# Patient Record
Sex: FEMALE
[De-identification: names, ages, dates, MRNs, and addresses within clinical notes are randomized per-mention and may not be internally consistent; named-entity substitution may affect disease eponyms.]

---

## 2017-01-19 ENCOUNTER — APPOINTMENT (OUTPATIENT)
Dept: INTERNAL MEDICINE | Facility: CLINIC | Age: 42
End: 2017-01-19

## 2017-02-07 ENCOUNTER — APPOINTMENT (OUTPATIENT)
Dept: INTERNAL MEDICINE | Facility: CLINIC | Age: 42
End: 2017-02-07

## 2017-02-07 VITALS
HEART RATE: 102 BPM | OXYGEN SATURATION: 98 % | DIASTOLIC BLOOD PRESSURE: 62 MMHG | HEIGHT: 61.5 IN | WEIGHT: 130 LBS | SYSTOLIC BLOOD PRESSURE: 108 MMHG | TEMPERATURE: 99.1 F | BODY MASS INDEX: 24.23 KG/M2

## 2017-02-07 DIAGNOSIS — T81.31XA DISRUPTION OF EXTERNAL OPERATION (SURGICAL) WOUND, NOT ELSEWHERE CLASSIFIED, INITIAL ENCOUNTER: ICD-10-CM

## 2017-02-08 LAB
25(OH)D3 SERPL-MCNC: 31.2 NG/ML
ALBUMIN SERPL ELPH-MCNC: 4.2 G/DL
ALP BLD-CCNC: 69 U/L
ALT SERPL-CCNC: 19 U/L
ANION GAP SERPL CALC-SCNC: 12 MMOL/L
AST SERPL-CCNC: 23 U/L
BASOPHILS # BLD AUTO: 0.04 K/UL
BASOPHILS NFR BLD AUTO: 0.5 %
BILIRUB SERPL-MCNC: 0.6 MG/DL
BUN SERPL-MCNC: 10 MG/DL
CALCIUM SERPL-MCNC: 9.7 MG/DL
CHLORIDE SERPL-SCNC: 102 MMOL/L
CHOLEST SERPL-MCNC: 181 MG/DL
CHOLEST/HDLC SERPL: 2.7 RATIO
CO2 SERPL-SCNC: 25 MMOL/L
CREAT SERPL-MCNC: 0.81 MG/DL
EOSINOPHIL # BLD AUTO: 0.17 K/UL
EOSINOPHIL NFR BLD AUTO: 2.1 %
GLUCOSE SERPL-MCNC: 97 MG/DL
HCT VFR BLD CALC: 43.7 %
HDLC SERPL-MCNC: 67 MG/DL
HGB BLD-MCNC: 13.4 G/DL
HIV1+2 AB SPEC QL IA.RAPID: NONREACTIVE
HSV 1+2 IGG SER IA-IMP: POSITIVE
HSV 1+2 IGG SER IA-IMP: POSITIVE
HSV1 IGG SER QL: 49.9 INDEX
HSV2 IGG SER QL: 8.45 INDEX
IMM GRANULOCYTES NFR BLD AUTO: 0.1 %
LDLC SERPL CALC-MCNC: 100 MG/DL
LYMPHOCYTES # BLD AUTO: 2.03 K/UL
LYMPHOCYTES NFR BLD AUTO: 25.1 %
MAN DIFF?: NORMAL
MCHC RBC-ENTMCNC: 27.7 PG
MCHC RBC-ENTMCNC: 30.7 GM/DL
MCV RBC AUTO: 90.5 FL
MONOCYTES # BLD AUTO: 0.36 K/UL
MONOCYTES NFR BLD AUTO: 4.4 %
NEUTROPHILS # BLD AUTO: 5.49 K/UL
NEUTROPHILS NFR BLD AUTO: 67.8 %
PLATELET # BLD AUTO: 294 K/UL
POTASSIUM SERPL-SCNC: 4.3 MMOL/L
PROT SERPL-MCNC: 7.1 G/DL
RBC # BLD: 4.83 M/UL
RBC # FLD: 14.4 %
SODIUM SERPL-SCNC: 139 MMOL/L
T PALLIDUM AB SER QL IA: NEGATIVE
TRIGL SERPL-MCNC: 72 MG/DL
TSH SERPL-ACNC: 0.67 UIU/ML
WBC # FLD AUTO: 8.1 K/UL

## 2017-04-03 ENCOUNTER — APPOINTMENT (OUTPATIENT)
Dept: DERMATOLOGY | Facility: CLINIC | Age: 42
End: 2017-04-03

## 2017-04-24 ENCOUNTER — APPOINTMENT (OUTPATIENT)
Dept: DERMATOLOGY | Facility: CLINIC | Age: 42
End: 2017-04-24

## 2017-04-24 VITALS
WEIGHT: 130 LBS | DIASTOLIC BLOOD PRESSURE: 80 MMHG | BODY MASS INDEX: 24.55 KG/M2 | HEIGHT: 61 IN | SYSTOLIC BLOOD PRESSURE: 120 MMHG

## 2017-06-02 ENCOUNTER — APPOINTMENT (OUTPATIENT)
Dept: PLASTIC SURGERY | Facility: CLINIC | Age: 42
End: 2017-06-02

## 2018-01-08 ENCOUNTER — APPOINTMENT (OUTPATIENT)
Dept: INTERNAL MEDICINE | Facility: CLINIC | Age: 43
End: 2018-01-08
Payer: MEDICAID

## 2018-01-08 ENCOUNTER — OTHER (OUTPATIENT)
Age: 43
End: 2018-01-08

## 2018-01-08 VITALS
SYSTOLIC BLOOD PRESSURE: 120 MMHG | DIASTOLIC BLOOD PRESSURE: 60 MMHG | TEMPERATURE: 98 F | BODY MASS INDEX: 22.44 KG/M2 | OXYGEN SATURATION: 99 % | HEART RATE: 61 BPM | HEIGHT: 61.5 IN | WEIGHT: 120.38 LBS

## 2018-01-08 DIAGNOSIS — Z84.0 FAMILY HISTORY OF DISEASES OF THE SKIN AND SUBCUTANEOUS TISSUE: ICD-10-CM

## 2018-01-08 DIAGNOSIS — L90.5 SCAR CONDITIONS AND FIBROSIS OF SKIN: ICD-10-CM

## 2018-01-08 DIAGNOSIS — E85.4 ORGAN-LIMITED AMYLOIDOSIS: ICD-10-CM

## 2018-01-08 DIAGNOSIS — L99 ORGAN-LIMITED AMYLOIDOSIS: ICD-10-CM

## 2018-01-08 DIAGNOSIS — H61.21 IMPACTED CERUMEN, RIGHT EAR: ICD-10-CM

## 2018-01-08 DIAGNOSIS — R21 RASH AND OTHER NONSPECIFIC SKIN ERUPTION: ICD-10-CM

## 2018-01-08 DIAGNOSIS — E53.8 DEFICIENCY OF OTHER SPECIFIED B GROUP VITAMINS: ICD-10-CM

## 2018-01-08 DIAGNOSIS — Z87.891 PERSONAL HISTORY OF NICOTINE DEPENDENCE: ICD-10-CM

## 2018-01-08 DIAGNOSIS — L74.519 PRIMARY FOCAL HYPERHIDROSIS, UNSPECIFIED: ICD-10-CM

## 2018-01-08 PROCEDURE — 99396 PREV VISIT EST AGE 40-64: CPT | Mod: 25

## 2018-01-08 PROCEDURE — 36415 COLL VENOUS BLD VENIPUNCTURE: CPT

## 2018-01-08 PROCEDURE — 99212 OFFICE O/P EST SF 10 MIN: CPT | Mod: 25

## 2018-01-08 PROCEDURE — 93000 ELECTROCARDIOGRAM COMPLETE: CPT

## 2018-01-08 PROCEDURE — 90686 IIV4 VACC NO PRSV 0.5 ML IM: CPT

## 2018-01-08 PROCEDURE — G0008: CPT

## 2018-01-08 RX ORDER — ALUMINUM CHLORIDE 20 %
20 SOLUTION, NON-ORAL TOPICAL
Qty: 1 | Refills: 6 | Status: COMPLETED | COMMUNITY
Start: 2017-05-04 | End: 2018-01-08

## 2018-01-09 LAB
25(OH)D3 SERPL-MCNC: 38.6 NG/ML
ALBUMIN SERPL ELPH-MCNC: 4.1 G/DL
ALP BLD-CCNC: 67 U/L
ALT SERPL-CCNC: 15 U/L
ANION GAP SERPL CALC-SCNC: 11 MMOL/L
AST SERPL-CCNC: 25 U/L
BASOPHILS # BLD AUTO: 0.05 K/UL
BASOPHILS NFR BLD AUTO: 0.8 %
BILIRUB SERPL-MCNC: 0.6 MG/DL
BUN SERPL-MCNC: 13 MG/DL
CALCIUM SERPL-MCNC: 9.4 MG/DL
CHLORIDE SERPL-SCNC: 106 MMOL/L
CHOLEST SERPL-MCNC: 142 MG/DL
CHOLEST/HDLC SERPL: 2.2 RATIO
CO2 SERPL-SCNC: 23 MMOL/L
CREAT SERPL-MCNC: 0.89 MG/DL
EOSINOPHIL # BLD AUTO: 0.11 K/UL
EOSINOPHIL NFR BLD AUTO: 1.8 %
GLUCOSE SERPL-MCNC: 86 MG/DL
HBA1C MFR BLD HPLC: 5.2 %
HCT VFR BLD CALC: 42.5 %
HDLC SERPL-MCNC: 64 MG/DL
HGB BLD-MCNC: 13.5 G/DL
HIV1+2 AB SPEC QL IA.RAPID: NONREACTIVE
HSV 1+2 IGG SER IA-IMP: POSITIVE
HSV 1+2 IGG SER IA-IMP: POSITIVE
HSV1 IGG SER QL: 40 INDEX
HSV2 IGG SER QL: 5.63 INDEX
IMM GRANULOCYTES NFR BLD AUTO: 0.2 %
IRON SATN MFR SERPL: 27 %
IRON SERPL-MCNC: 72 UG/DL
LDLC SERPL CALC-MCNC: 72 MG/DL
LYMPHOCYTES # BLD AUTO: 1.62 K/UL
LYMPHOCYTES NFR BLD AUTO: 26.7 %
MAN DIFF?: NORMAL
MCHC RBC-ENTMCNC: 28.1 PG
MCHC RBC-ENTMCNC: 31.8 GM/DL
MCV RBC AUTO: 88.5 FL
MONOCYTES # BLD AUTO: 0.24 K/UL
MONOCYTES NFR BLD AUTO: 4 %
NEUTROPHILS # BLD AUTO: 4.04 K/UL
NEUTROPHILS NFR BLD AUTO: 66.5 %
PLATELET # BLD AUTO: 284 K/UL
POTASSIUM SERPL-SCNC: 4.4 MMOL/L
PROT SERPL-MCNC: 7.3 G/DL
RBC # BLD: 4.8 M/UL
RBC # FLD: 14.1 %
SODIUM SERPL-SCNC: 140 MMOL/L
T PALLIDUM AB SER QL IA: NEGATIVE
TIBC SERPL-MCNC: 269 UG/DL
TRIGL SERPL-MCNC: 28 MG/DL
TSH SERPL-ACNC: 0.73 UIU/ML
UIBC SERPL-MCNC: 197 UG/DL
VIT B12 SERPL-MCNC: >2000 PG/ML
WBC # FLD AUTO: 6.07 K/UL

## 2018-05-03 ENCOUNTER — APPOINTMENT (OUTPATIENT)
Dept: INTERNAL MEDICINE | Facility: CLINIC | Age: 43
End: 2018-05-03
Payer: COMMERCIAL

## 2018-05-03 VITALS
HEIGHT: 65 IN | DIASTOLIC BLOOD PRESSURE: 70 MMHG | SYSTOLIC BLOOD PRESSURE: 110 MMHG | BODY MASS INDEX: 20.83 KG/M2 | OXYGEN SATURATION: 98 % | TEMPERATURE: 98.2 F | WEIGHT: 125 LBS | HEART RATE: 65 BPM

## 2018-05-03 PROCEDURE — 36415 COLL VENOUS BLD VENIPUNCTURE: CPT

## 2018-05-03 PROCEDURE — 99214 OFFICE O/P EST MOD 30 MIN: CPT | Mod: 25

## 2018-05-03 RX ORDER — PAROXETINE HYDROCHLORIDE 10 MG/1
10 TABLET, FILM COATED ORAL
Qty: 30 | Refills: 0 | Status: COMPLETED | COMMUNITY
Start: 2017-08-22 | End: 2018-05-03

## 2018-05-04 ENCOUNTER — MOBILE ON CALL (OUTPATIENT)
Age: 43
End: 2018-05-04

## 2018-05-07 LAB
ALBUMIN SERPL ELPH-MCNC: 4.3 G/DL
ALP BLD-CCNC: 60 U/L
ALT SERPL-CCNC: 19 U/L
ANION GAP SERPL CALC-SCNC: 12 MMOL/L
AST SERPL-CCNC: 21 U/L
BILIRUB SERPL-MCNC: 0.8 MG/DL
BUN SERPL-MCNC: 19 MG/DL
CALCIUM SERPL-MCNC: 9.4 MG/DL
CHLORIDE SERPL-SCNC: 102 MMOL/L
CO2 SERPL-SCNC: 23 MMOL/L
CREAT SERPL-MCNC: 0.94 MG/DL
GLUCOSE SERPL-MCNC: 87 MG/DL
MAGNESIUM SERPL-MCNC: 2 MG/DL
POTASSIUM SERPL-SCNC: 4.2 MMOL/L
PROT SERPL-MCNC: 7.1 G/DL
SODIUM SERPL-SCNC: 137 MMOL/L
TSH SERPL-ACNC: 0.81 UIU/ML
VIT B12 SERPL-MCNC: 1915 PG/ML

## 2018-06-22 ENCOUNTER — MOBILE ON CALL (OUTPATIENT)
Age: 43
End: 2018-06-22

## 2018-06-25 ENCOUNTER — RX RENEWAL (OUTPATIENT)
Age: 43
End: 2018-06-25

## 2018-06-29 ENCOUNTER — MOBILE ON CALL (OUTPATIENT)
Age: 43
End: 2018-06-29

## 2018-07-02 ENCOUNTER — RX RENEWAL (OUTPATIENT)
Age: 43
End: 2018-07-02

## 2020-01-07 ENCOUNTER — APPOINTMENT (OUTPATIENT)
Dept: INTERNAL MEDICINE | Facility: CLINIC | Age: 45
End: 2020-01-07

## 2020-02-05 ENCOUNTER — APPOINTMENT (OUTPATIENT)
Dept: INTERNAL MEDICINE | Facility: CLINIC | Age: 45
End: 2020-02-05
Payer: COMMERCIAL

## 2020-02-05 ENCOUNTER — NON-APPOINTMENT (OUTPATIENT)
Age: 45
End: 2020-02-05

## 2020-02-05 VITALS
OXYGEN SATURATION: 98 % | WEIGHT: 131 LBS | HEART RATE: 74 BPM | DIASTOLIC BLOOD PRESSURE: 64 MMHG | HEIGHT: 63 IN | BODY MASS INDEX: 23.21 KG/M2 | TEMPERATURE: 97.6 F | SYSTOLIC BLOOD PRESSURE: 118 MMHG

## 2020-02-05 DIAGNOSIS — Z80.3 FAMILY HISTORY OF MALIGNANT NEOPLASM OF BREAST: ICD-10-CM

## 2020-02-05 DIAGNOSIS — F32.81 PREMENSTRUAL DYSPHORIC DISORDER: ICD-10-CM

## 2020-02-05 DIAGNOSIS — Z12.83 ENCOUNTER FOR SCREENING FOR MALIGNANT NEOPLASM OF SKIN: ICD-10-CM

## 2020-02-05 DIAGNOSIS — Z86.018 PERSONAL HISTORY OF OTHER BENIGN NEOPLASM: ICD-10-CM

## 2020-02-05 DIAGNOSIS — Z84.1 FAMILY HISTORY OF DISORDERS OF KIDNEY AND URETER: ICD-10-CM

## 2020-02-05 DIAGNOSIS — S09.90XA UNSPECIFIED INJURY OF HEAD, INITIAL ENCOUNTER: ICD-10-CM

## 2020-02-05 DIAGNOSIS — Z92.29 PERSONAL HISTORY OF OTHER DRUG THERAPY: ICD-10-CM

## 2020-02-05 DIAGNOSIS — G24.5 BLEPHAROSPASM: ICD-10-CM

## 2020-02-05 DIAGNOSIS — F41.9 ANXIETY DISORDER, UNSPECIFIED: ICD-10-CM

## 2020-02-05 DIAGNOSIS — L91.0 HYPERTROPHIC SCAR: ICD-10-CM

## 2020-02-05 DIAGNOSIS — F32.9 ANXIETY DISORDER, UNSPECIFIED: ICD-10-CM

## 2020-02-05 PROCEDURE — 99396 PREV VISIT EST AGE 40-64: CPT | Mod: 25

## 2020-02-05 PROCEDURE — 36415 COLL VENOUS BLD VENIPUNCTURE: CPT

## 2020-02-05 PROCEDURE — 93000 ELECTROCARDIOGRAM COMPLETE: CPT

## 2020-02-05 RX ORDER — CHLORHEXIDINE GLUCONATE 4 %
1000 LIQUID (ML) TOPICAL
Refills: 0 | Status: COMPLETED | COMMUNITY
End: 2020-02-05

## 2020-02-05 RX ORDER — METHYLPHENIDATE HYDROCHLORIDE 5 MG/1
5 TABLET ORAL
Refills: 0 | Status: COMPLETED | COMMUNITY
End: 2020-02-05

## 2020-02-05 NOTE — PHYSICAL EXAM
[No Acute Distress] : no acute distress [Well Nourished] : well nourished [Well-Appearing] : well-appearing [Well Developed] : well developed [PERRL] : pupils equal round and reactive to light [Normal Sclera/Conjunctiva] : normal sclera/conjunctiva [EOMI] : extraocular movements intact [Normal Oropharynx] : the oropharynx was normal [No JVD] : no jugular venous distention [Normal Outer Ear/Nose] : the outer ears and nose were normal in appearance [No Lymphadenopathy] : no lymphadenopathy [Supple] : supple [Thyroid Normal, No Nodules] : the thyroid was normal and there were no nodules present [No Accessory Muscle Use] : no accessory muscle use [No Respiratory Distress] : no respiratory distress  [Normal Rate] : normal rate  [Clear to Auscultation] : lungs were clear to auscultation bilaterally [No Murmur] : no murmur heard [Regular Rhythm] : with a regular rhythm [Normal S1, S2] : normal S1 and S2 [No Carotid Bruits] : no carotid bruits [No Varicosities] : no varicosities [No Abdominal Bruit] : a ~M bruit was not heard ~T in the abdomen [Pedal Pulses Present] : the pedal pulses are present [No Palpable Aorta] : no palpable aorta [No Edema] : there was no peripheral edema [Soft] : abdomen soft [Non Tender] : non-tender [No Extremity Clubbing/Cyanosis] : no extremity clubbing/cyanosis [Non-distended] : non-distended [No HSM] : no HSM [Normal Posterior Cervical Nodes] : no posterior cervical lymphadenopathy [Normal Bowel Sounds] : normal bowel sounds [No Spinal Tenderness] : no spinal tenderness [No CVA Tenderness] : no CVA  tenderness [Normal Anterior Cervical Nodes] : no anterior cervical lymphadenopathy [No Joint Swelling] : no joint swelling [Grossly Normal Strength/Tone] : grossly normal strength/tone [No Focal Deficits] : no focal deficits [Coordination Grossly Intact] : coordination grossly intact [No Rash] : no rash [Normal Gait] : normal gait [Normal Insight/Judgement] : insight and judgment were intact [Normal Affect] : the affect was normal [Deep Tendon Reflexes (DTR)] : deep tendon reflexes were 2+ and symmetric [No Masses] : no palpable masses [Alert and Oriented x3] : oriented to person, place, and time [No Nipple Discharge] : no nipple discharge [de-identified] : keloids present, s/p augmentation

## 2020-02-05 NOTE — ASSESSMENT
[FreeTextEntry1] : 44 year is here for a CPE. She was counselled on diet and exercise, drug and alcohol use, age appropriate health care maintenance including vaccines, seatbelts, sunscreen, stress reduction and safe sex. All questions asked/answered to the best of my ability.\par

## 2020-02-05 NOTE — HISTORY OF PRESENT ILLNESS
[de-identified] : 43 y/o female is here for CPE.\par Seeing psych regularly.\par Was given Lamictal but didn't take because her dermatologist in [place] told her not to take it so she hasn't. She will discuss with psych at next appointment.\par Mother has unknown kidney disease.\par Aunt now has breast cancer. She agrees to get screened this year.\par

## 2020-02-05 NOTE — HEALTH RISK ASSESSMENT
[Good] : ~his/her~  mood as  good [0] : 2) Feeling down, depressed, or hopeless: Not at all (0) [Patient reported mammogram was normal] : Patient reported mammogram was normal [Patient reported PAP Smear was normal] : Patient reported PAP Smear was normal [HIV Test offered] : HIV Test offered [None] : None [[id number]] : 0 [Change in mental status noted] : No change in mental status noted [Language] : denies difficulty with language [Behavior] : denies difficulty with behavior [Handling Complex Tasks] : denies difficulty handling complex tasks [Learning/Retaining New Information] : denies difficulty learning/retaining new information [Spatial Ability and Orientation] : denies difficulty with spatial ability and orientation [Reasoning] : denies difficulty with reasoning [PapSmearDate] : 04/19 [MammogramDate] : 04/18

## 2020-02-06 LAB
25(OH)D3 SERPL-MCNC: 20.4 NG/ML
ALBUMIN SERPL ELPH-MCNC: 4.6 G/DL
ALP BLD-CCNC: 70 U/L
ALT SERPL-CCNC: 17 U/L
ANION GAP SERPL CALC-SCNC: 15 MMOL/L
AST SERPL-CCNC: 22 U/L
BASOPHILS # BLD AUTO: 0.08 K/UL
BASOPHILS NFR BLD AUTO: 0.9 %
BILIRUB SERPL-MCNC: 0.6 MG/DL
BUN SERPL-MCNC: 15 MG/DL
CALCIUM SERPL-MCNC: 9.4 MG/DL
CHLORIDE SERPL-SCNC: 105 MMOL/L
CHOLEST SERPL-MCNC: 176 MG/DL
CHOLEST/HDLC SERPL: 2.3 RATIO
CO2 SERPL-SCNC: 20 MMOL/L
CREAT SERPL-MCNC: 0.8 MG/DL
EOSINOPHIL # BLD AUTO: 0.07 K/UL
EOSINOPHIL NFR BLD AUTO: 0.8 %
ESTIMATED AVERAGE GLUCOSE: 108 MG/DL
GLUCOSE SERPL-MCNC: 95 MG/DL
HBA1C MFR BLD HPLC: 5.4 %
HCT VFR BLD CALC: 41.9 %
HDLC SERPL-MCNC: 75 MG/DL
HGB BLD-MCNC: 13.2 G/DL
HIV1+2 AB SPEC QL IA.RAPID: NONREACTIVE
IMM GRANULOCYTES NFR BLD AUTO: 0.2 %
LDLC SERPL CALC-MCNC: 91 MG/DL
LYMPHOCYTES # BLD AUTO: 2.03 K/UL
LYMPHOCYTES NFR BLD AUTO: 24 %
MAN DIFF?: NORMAL
MCHC RBC-ENTMCNC: 28 PG
MCHC RBC-ENTMCNC: 31.5 GM/DL
MCV RBC AUTO: 88.8 FL
MONOCYTES # BLD AUTO: 0.38 K/UL
MONOCYTES NFR BLD AUTO: 4.5 %
NEUTROPHILS # BLD AUTO: 5.88 K/UL
NEUTROPHILS NFR BLD AUTO: 69.6 %
PLATELET # BLD AUTO: 293 K/UL
POTASSIUM SERPL-SCNC: 4.2 MMOL/L
PROT SERPL-MCNC: 7.3 G/DL
RBC # BLD: 4.72 M/UL
RBC # FLD: 13.7 %
SODIUM SERPL-SCNC: 140 MMOL/L
T PALLIDUM AB SER QL IA: NEGATIVE
TRIGL SERPL-MCNC: 46 MG/DL
TSH SERPL-ACNC: 0.56 UIU/ML
VIT B12 SERPL-MCNC: 1165 PG/ML
WBC # FLD AUTO: 8.46 K/UL

## 2021-10-12 ENCOUNTER — NON-APPOINTMENT (OUTPATIENT)
Age: 46
End: 2021-10-12

## 2021-10-12 ENCOUNTER — APPOINTMENT (OUTPATIENT)
Dept: INTERNAL MEDICINE | Facility: CLINIC | Age: 46
End: 2021-10-12
Payer: COMMERCIAL

## 2021-10-12 VITALS
SYSTOLIC BLOOD PRESSURE: 118 MMHG | HEART RATE: 68 BPM | HEIGHT: 63 IN | TEMPERATURE: 97.1 F | OXYGEN SATURATION: 99 % | DIASTOLIC BLOOD PRESSURE: 62 MMHG | WEIGHT: 123 LBS | BODY MASS INDEX: 21.79 KG/M2

## 2021-10-12 DIAGNOSIS — Z78.9 OTHER SPECIFIED HEALTH STATUS: ICD-10-CM

## 2021-10-12 DIAGNOSIS — Z00.00 ENCOUNTER FOR GENERAL ADULT MEDICAL EXAMINATION W/OUT ABNORMAL FINDINGS: ICD-10-CM

## 2021-10-12 DIAGNOSIS — D64.9 ANEMIA, UNSPECIFIED: ICD-10-CM

## 2021-10-12 DIAGNOSIS — M67.921 UNSPECIFIED DISORDER OF SYNOVIUM AND TENDON, RIGHT UPPER ARM: ICD-10-CM

## 2021-10-12 DIAGNOSIS — Z51.81 ENCOUNTER FOR THERAPEUTIC DRUG LVL MONITORING: ICD-10-CM

## 2021-10-12 DIAGNOSIS — Z83.71 FAMILY HISTORY OF COLONIC POLYPS: ICD-10-CM

## 2021-10-12 DIAGNOSIS — Z11.3 ENCOUNTER FOR SCREENING FOR INFECTIONS WITH A PREDOMINANTLY SEXUAL MODE OF TRANSMISSION: ICD-10-CM

## 2021-10-12 DIAGNOSIS — Z63.4 DISAPPEARANCE AND DEATH OF FAMILY MEMBER: ICD-10-CM

## 2021-10-12 DIAGNOSIS — Z13.39 ENCOUNTER FOR SCREENING EXAM FOR OTHER MENTAL HEALTH AND BEHAVIORAL DISORDERS: ICD-10-CM

## 2021-10-12 PROCEDURE — 99396 PREV VISIT EST AGE 40-64: CPT | Mod: 25

## 2021-10-12 PROCEDURE — 93000 ELECTROCARDIOGRAM COMPLETE: CPT

## 2021-10-12 PROCEDURE — 99212 OFFICE O/P EST SF 10 MIN: CPT | Mod: 25

## 2021-10-12 PROCEDURE — 36415 COLL VENOUS BLD VENIPUNCTURE: CPT

## 2021-10-12 RX ORDER — BUPROPION HYDROCHLORIDE 150 MG/1
150 TABLET, EXTENDED RELEASE ORAL
Qty: 30 | Refills: 0 | Status: COMPLETED | COMMUNITY
Start: 2018-04-09 | End: 2021-10-12

## 2021-10-12 RX ORDER — METHYLPHENIDATE HYDROCHLORIDE 20 MG/1
20 TABLET ORAL
Refills: 0 | Status: ACTIVE | COMMUNITY

## 2021-10-12 RX ORDER — IBUPROFEN 400 MG/1
400 TABLET, FILM COATED ORAL 3 TIMES DAILY
Qty: 60 | Refills: 2 | Status: ACTIVE | COMMUNITY
Start: 2021-10-12 | End: 1900-01-01

## 2021-10-12 RX ORDER — TOCOPHERSOLAN (VITAMIN E TPGS) 400/15ML
LIQUID (ML) ORAL
Refills: 0 | Status: ACTIVE | COMMUNITY

## 2021-10-12 RX ORDER — METHYLPHENIDATE HYDROCHLORIDE 5 MG/1
5 TABLET ORAL TWICE DAILY
Qty: 60 | Refills: 0 | Status: COMPLETED | COMMUNITY
Start: 2018-07-02 | End: 2021-10-12

## 2021-10-12 RX ORDER — ALPRAZOLAM 0.5 MG/1
0.5 TABLET ORAL
Qty: 60 | Refills: 0 | Status: COMPLETED | COMMUNITY
Start: 2017-02-07 | End: 2021-10-12

## 2021-10-12 RX ORDER — MAGNESIUM OXIDE/MAG AA CHELATE 300 MG
CAPSULE ORAL
Refills: 0 | Status: ACTIVE | COMMUNITY

## 2021-10-12 RX ORDER — CHLORHEXIDINE GLUCONATE 4 %
LIQUID (ML) TOPICAL
Refills: 0 | Status: ACTIVE | COMMUNITY

## 2021-10-12 SDOH — SOCIAL STABILITY - SOCIAL INSECURITY: DISSAPEARANCE AND DEATH OF FAMILY MEMBER: Z63.4

## 2021-10-12 NOTE — PHYSICAL EXAM
[No Acute Distress] : no acute distress [Well Nourished] : well nourished [Well Developed] : well developed [Well-Appearing] : well-appearing [Normal Sclera/Conjunctiva] : normal sclera/conjunctiva [PERRL] : pupils equal round and reactive to light [EOMI] : extraocular movements intact [Normal Outer Ear/Nose] : the outer ears and nose were normal in appearance [Normal Oropharynx] : the oropharynx was normal [No JVD] : no jugular venous distention [No Lymphadenopathy] : no lymphadenopathy [Supple] : supple [Thyroid Normal, No Nodules] : the thyroid was normal and there were no nodules present [No Respiratory Distress] : no respiratory distress  [No Accessory Muscle Use] : no accessory muscle use [Clear to Auscultation] : lungs were clear to auscultation bilaterally [Normal Rate] : normal rate  [Regular Rhythm] : with a regular rhythm [Normal S1, S2] : normal S1 and S2 [No Murmur] : no murmur heard [No Carotid Bruits] : no carotid bruits [No Abdominal Bruit] : a ~M bruit was not heard ~T in the abdomen [No Varicosities] : no varicosities [Pedal Pulses Present] : the pedal pulses are present [No Edema] : there was no peripheral edema [No Palpable Aorta] : no palpable aorta [No Extremity Clubbing/Cyanosis] : no extremity clubbing/cyanosis [Soft] : abdomen soft [Non Tender] : non-tender [Non-distended] : non-distended [No Masses] : no abdominal mass palpated [No HSM] : no HSM [Normal Bowel Sounds] : normal bowel sounds [Normal Posterior Cervical Nodes] : no posterior cervical lymphadenopathy [Normal Anterior Cervical Nodes] : no anterior cervical lymphadenopathy [No CVA Tenderness] : no CVA  tenderness [No Spinal Tenderness] : no spinal tenderness [No Joint Swelling] : no joint swelling [Grossly Normal Strength/Tone] : grossly normal strength/tone [No Rash] : no rash [Coordination Grossly Intact] : coordination grossly intact [No Focal Deficits] : no focal deficits [Normal Gait] : normal gait [Deep Tendon Reflexes (DTR)] : deep tendon reflexes were 2+ and symmetric [Normal Affect] : the affect was normal [Normal Insight/Judgement] : insight and judgment were intact [Alert and Oriented x3] : oriented to person, place, and time [de-identified] : b/p enlargement procedure, no masses appreciated

## 2021-10-12 NOTE — HISTORY OF PRESENT ILLNESS
[de-identified] : 45 y/o female presebnts for CPE.\par Sees therapist and psychiatrist. \par Father and   this past year ( ODd).\par Off Benzos because she is "Scared" of what happened to . [place] \[place] Has large fibroid. Recently saw GYN.\par Thinks she is deficient in "Vitamins."\par Brother has colon polyps and his GI recommended she get scoped.\par Has a lot of joint pain, in particular her elbow. ALl started after she started exercsiing. Wants to see sports med.\par \par \par Had COVID 2020.\par Had vaccine x 2 doses.

## 2021-10-12 NOTE — ASSESSMENT
[FreeTextEntry1] : 46 year is here for a CPE. She was counselled on diet and exercise, drug and alcohol use, age appropriate health care maintenance including vaccines, seatbelts, sunscreen, stress reduction and safe sex. All questions asked/answered to the best of my ability.\par Labs sent.\par Due for MAMMO.\par Declined flu shot.\par Referred to sports med. Ibuprofen prescribed.\par Declined flu shot.\par

## 2021-10-12 NOTE — REVIEW OF SYSTEMS
[Negative] : Heme/Lymph [Joint Pain] : joint pain [Joint Stiffness] : joint stiffness [Muscle Pain] : no muscle pain [Back Pain] : no back pain

## 2021-10-12 NOTE — HEALTH RISK ASSESSMENT
[Patient reported mammogram was normal] : Patient reported mammogram was normal [Patient reported PAP Smear was normal] : Patient reported PAP Smear was normal [HIV Test offered] : HIV Test offered [Good] : ~his/her~  mood as  good [Yes] : Yes [No falls in past year] : Patient reported no falls in the past year [0] : 2) Feeling down, depressed, or hopeless: Not at all (0) [PHQ-2 Negative - No further assessment needed] : PHQ-2 Negative - No further assessment needed [No] : In the past 12 months have you used drugs other than those required for medical reasons? No [[id number]] : 0 [Change in mental status noted] : No change in mental status noted [Language] : denies difficulty with language [Behavior] : denies difficulty with behavior [Learning/Retaining New Information] : denies difficulty learning/retaining new information [Handling Complex Tasks] : denies difficulty handling complex tasks [Reasoning] : denies difficulty with reasoning [Spatial Ability and Orientation] : denies difficulty with spatial ability and orientation [None] : None [Alone] : lives alone [Employed] : employed [] :  [Sexually Active] : sexually active [Feels Safe at Home] : Feels safe at home [Fully functional (bathing, dressing, toileting, transferring, walking, feeding)] : Fully functional (bathing, dressing, toileting, transferring, walking, feeding) [Fully functional (using the telephone, shopping, preparing meals, housekeeping, doing laundry, using] : Fully functional and needs no help or supervision to perform IADLs (using the telephone, shopping, preparing meals, housekeeping, doing laundry, using transportation, managing medications and managing finances) [Reports changes in hearing] : Reports no changes in hearing [Reports changes in vision] : Reports no changes in vision [Seat Belt] :  uses seat belt [Sunscreen] : uses sunscreen [MammogramDate] : 12/20 [PapSmearDate] : 10/21

## 2021-10-13 LAB
25(OH)D3 SERPL-MCNC: 46.6 NG/ML
ALBUMIN SERPL ELPH-MCNC: 4 G/DL
ALP BLD-CCNC: 68 U/L
ALT SERPL-CCNC: 13 U/L
ANION GAP SERPL CALC-SCNC: 13 MMOL/L
AST SERPL-CCNC: 19 U/L
BASOPHILS # BLD AUTO: 0.07 K/UL
BASOPHILS NFR BLD AUTO: 0.8 %
BILIRUB SERPL-MCNC: 0.5 MG/DL
BUN SERPL-MCNC: 10 MG/DL
CALCIUM SERPL-MCNC: 8.8 MG/DL
CHLORIDE SERPL-SCNC: 106 MMOL/L
CHOLEST SERPL-MCNC: 165 MG/DL
CO2 SERPL-SCNC: 19 MMOL/L
CREAT SERPL-MCNC: 0.82 MG/DL
EOSINOPHIL # BLD AUTO: 0.09 K/UL
EOSINOPHIL NFR BLD AUTO: 1.1 %
ESTIMATED AVERAGE GLUCOSE: 108 MG/DL
FERRITIN SERPL-MCNC: 13 NG/ML
GLUCOSE SERPL-MCNC: 107 MG/DL
HBA1C MFR BLD HPLC: 5.4 %
HCT VFR BLD CALC: 33.6 %
HDLC SERPL-MCNC: 71 MG/DL
HGB BLD-MCNC: 10.1 G/DL
HIV1+2 AB SPEC QL IA.RAPID: NONREACTIVE
HSV 1+2 IGG SER IA-IMP: POSITIVE
HSV 1+2 IGG SER IA-IMP: POSITIVE
HSV1 IGG SER QL: 41.1 INDEX
HSV2 IGG SER QL: 6.98 INDEX
IMM GRANULOCYTES NFR BLD AUTO: 0.4 %
IRON SATN MFR SERPL: 9 %
IRON SERPL-MCNC: 30 UG/DL
LDLC SERPL CALC-MCNC: 85 MG/DL
LYMPHOCYTES # BLD AUTO: 1.28 K/UL
LYMPHOCYTES NFR BLD AUTO: 15.2 %
MAN DIFF?: NORMAL
MCHC RBC-ENTMCNC: 24.3 PG
MCHC RBC-ENTMCNC: 30.1 GM/DL
MCV RBC AUTO: 80.8 FL
MONOCYTES # BLD AUTO: 0.43 K/UL
MONOCYTES NFR BLD AUTO: 5.1 %
NEUTROPHILS # BLD AUTO: 6.54 K/UL
NEUTROPHILS NFR BLD AUTO: 77.4 %
NONHDLC SERPL-MCNC: 94 MG/DL
PLATELET # BLD AUTO: 280 K/UL
POTASSIUM SERPL-SCNC: 4.5 MMOL/L
PROT SERPL-MCNC: 6.3 G/DL
RBC # BLD: 4.16 M/UL
RBC # FLD: 16.7 %
SODIUM SERPL-SCNC: 138 MMOL/L
T PALLIDUM AB SER QL IA: NEGATIVE
TIBC SERPL-MCNC: 354 UG/DL
TRIGL SERPL-MCNC: 45 MG/DL
TSH SERPL-ACNC: 0.41 UIU/ML
UIBC SERPL-MCNC: 324 UG/DL
VIT B12 SERPL-MCNC: 867 PG/ML
WBC # FLD AUTO: 8.44 K/UL

## 2021-10-15 ENCOUNTER — APPOINTMENT (OUTPATIENT)
Dept: ORTHOPEDIC SURGERY | Facility: CLINIC | Age: 46
End: 2021-10-15

## 2022-06-23 NOTE — PAST MEDICAL HISTORY
Yes [Menstruating] : menstruating [Definite ___ (Date)] : the last menstrual period was [unfilled] [Regular Cycle Intervals] : have been regular